# Patient Record
Sex: MALE | Race: WHITE | NOT HISPANIC OR LATINO | Employment: FULL TIME | URBAN - METROPOLITAN AREA
[De-identification: names, ages, dates, MRNs, and addresses within clinical notes are randomized per-mention and may not be internally consistent; named-entity substitution may affect disease eponyms.]

---

## 2017-01-23 ENCOUNTER — HOSPITAL ENCOUNTER (EMERGENCY)
Facility: CLINIC | Age: 42
Discharge: HOME | End: 2017-01-23
Attending: EMERGENCY MEDICINE | Admitting: EMERGENCY MEDICINE
Payer: OTHER MISCELLANEOUS

## 2017-01-23 ENCOUNTER — APPOINTMENT (OUTPATIENT)
Dept: GENERAL RADIOLOGY | Facility: CLINIC | Age: 42
End: 2017-01-23
Attending: EMERGENCY MEDICINE
Payer: OTHER MISCELLANEOUS

## 2017-01-23 VITALS
HEART RATE: 75 BPM | OXYGEN SATURATION: 99 % | DIASTOLIC BLOOD PRESSURE: 80 MMHG | RESPIRATION RATE: 20 BRPM | TEMPERATURE: 98.2 F | SYSTOLIC BLOOD PRESSURE: 132 MMHG

## 2017-01-23 DIAGNOSIS — S66.912A STRAIN OF LEFT WRIST, INITIAL ENCOUNTER: ICD-10-CM

## 2017-01-23 PROCEDURE — 73110 X-RAY EXAM OF WRIST: CPT | Mod: LT

## 2017-01-23 PROCEDURE — 99213 OFFICE O/P EST LOW 20 MIN: CPT | Performed by: EMERGENCY MEDICINE

## 2017-01-23 PROCEDURE — 99213 OFFICE O/P EST LOW 20 MIN: CPT

## 2017-01-23 NOTE — DISCHARGE INSTRUCTIONS
Return if symptoms worsen or new symptoms develop.  Follow-up with primary care physician next available.  Drink plenty of fluids.  Take ibuprofen or Tylenol for pain.  If increased pain or weakness or other symptoms present please follow-up in 1 week for repeat x-ray.  Treating Strains and Sprains  Strains and sprains happen when muscles or other soft tissues near your bones stretch or tear. These injuries can cause bruising, swelling, and pain. To ease your discomfort and speed the healing of your strain or sprain, follow the tips below. Remember, a strain or sprain can take 6 to 8 weeks to heal.     Important Note: Do not give aspirin to children or teens without discussing it with your healthcare provider first.        Ice first, heat later    Use ice for the first 24 to 48 hours after injury. Ice helps prevent swelling and reduce pain. Ice the injury for no more than 20 minutes at a time and allow at least  20 minutes between icing sessions.    Apply heat after the first 72 hours, once the swelling has gone down. Heat relaxes muscles and increases blood flow. Soak the injured area in warm water or use a heating pad set on low for no more than 15 minutes at a time.  Wrap and elevate    Wrap an injured limb firmly with an elastic bandage. This provides support and helps prevent swelling. Don t wear an elastic bandage overnight. Watch for tingling, numbness, or increased pain, and remove the bandage immediately if any of these occurs.    Elevate the injured area to help reduce swelling and throbbing. It s best to raise an injured limb above the level of your heart.     Medicines    Over-the-counter medicines such as acetaminophen or ibuprofen can help reduce pain. Some also help reduce swelling.    Take medicine only as directed.    Rest the area even if medicines are controlling the pain.  Rest    Rest the injured area by not using it for 24 hours.    When you re ready, return slowly to your normal activities.  Rest the injured area often.    Don t use or walk on an injured limb if it hurts.    2441-4387 [employer]. [address] [place] [address], [place] [address]. All rights reserved. This information is not intended as a substitute for professional medical care. Always follow your healthcare professional's instructions.

## 2017-01-23 NOTE — ED AVS SNAPSHOT
[facility] Emergency Department    [address] [address]    Phone:  [phone]    Fax:  [phone]                                       Thom Hillman   MRN: [id number]    Department:  [facility]   Date of Visit:  1/23/2017           Patient Information     Date Of Birth          1975        Your diagnoses for this visit were:     Strain of left wrist, initial encounter        You were seen by Miki Whitten MD.      Follow-up Information     Follow up with Pierre Hi MD.    Specialty:  Family Practice    Why:  Next week for recheck    Contact information:    XXX RETIRED XXX  XXX  [place] [address]          Follow up with [facility].    Specialty:  EMERGENCY MEDICINE    Why:  If symptoms worsen    Contact information:    [address] [place] [id number] [phone]    Additional information:    The medical center is located at   [address] [place] [address] (between [address] and   [facility] [address] in [place], four miles north   of [place]).        Discharge Instructions         Return if symptoms worsen or new symptoms develop.  Follow-up with primary care physician next available.  Drink plenty of fluids.  Take ibuprofen or Tylenol for pain.  If increased pain or weakness or other symptoms present please follow-up in 1 week for repeat x-ray.  Treating Strains and Sprains  Strains and sprains happen when muscles or other soft tissues near your bones stretch or tear. These injuries can cause bruising, swelling, and pain. To ease your discomfort and speed the healing of your strain or sprain, follow the tips below. Remember, a strain or sprain can take 6 to 8 weeks to heal.     Important Note: Do not give aspirin to children or teens without discussing it with your healthcare provider first.        Ice first, heat later    Use ice for the first 24 to 48 hours after injury. Ice helps prevent swelling and reduce pain. Ice the  injury for no more than 20 minutes at a time and allow at least  20 minutes between icing sessions.    Apply heat after the first 72 hours, once the swelling has gone down. Heat relaxes muscles and increases blood flow. Soak the injured area in warm water or use a heating pad set on low for no more than 15 minutes at a time.  Wrap and elevate    Wrap an injured limb firmly with an elastic bandage. This provides support and helps prevent swelling. Don t wear an elastic bandage overnight. Watch for tingling, numbness, or increased pain, and remove the bandage immediately if any of these occurs.    Elevate the injured area to help reduce swelling and throbbing. It s best to raise an injured limb above the level of your heart.     Medicines    Over-the-counter medicines such as acetaminophen or ibuprofen can help reduce pain. Some also help reduce swelling.    Take medicine only as directed.    Rest the area even if medicines are controlling the pain.  Rest    Rest the injured area by not using it for 24 hours.    When you re ready, return slowly to your normal activities. Rest the injured area often.    Don t use or walk on an injured limb if it hurts.    8876-4834 [employer]. [address] [place] [address]. All rights reserved. This information is not intended as a substitute for professional medical care. Always follow your healthcare professional's instructions.          24 Hour Appointment Hotline       To make an appointment at [facility], call [phone] ([phone]). If you don't have a family doctor or clinic, we will help you find one. [facility] clinics are conveniently located to serve the needs of you and your family.          ED Discharge Orders     [employer]                    Review of your medicines      Our records show that you are taking the medicines listed below. If these are incorrect, please call your family doctor or clinic.        Dose /  Directions Last dose taken    ALLERGEN IMMUNOTHERAPY PRESCRIPTION        Refills:  0        Beclomethasone Dipropionate 80 MCG/ACT Aers Nasal Spray   Commonly known as:  QNASL   Dose:  2 spray   Quantity:  8.7 g        Spray 2 sprays into both nostrils daily as needed   Refills:  1        EPINEPHrine 0.3 MG/0.3ML injection   Dose:  0.3 mg   Quantity:  0.6 mL        Inject 0.3 mLs (0.3 mg) into the muscle as needed for anaphylaxis   Refills:  2        levocetirizine 5 MG tablet   Commonly known as:  XYZAL   Dose:  5 mg   Quantity:  30 tablet        Take 1 tablet (5 mg) by mouth daily as needed for allergies   Refills:  11        meloxicam 7.5 MG tablet   Commonly known as:  MOBIC   Dose:  7.5 mg   Quantity:  30 tablet        Take 1 tablet (7.5 mg) by mouth daily   Refills:  1        VITAMIN D PO        Take  by mouth.   Refills:  0                Procedures and tests performed during your visit     Wrist XR, G/E 3 views, left      Orders Needing Specimen Collection     None      Pending Results     No orders found from 1/22/2017 to 1/24/2017.            Pending Culture Results     No orders found from 1/22/2017 to 1/24/2017.       Test Results from your hospital stay           1/23/2017  2:52 PM - Interface, Radiant Ib      Narrative     XR WRIST LEFT G/E 3 VIEWS 1/23/2017 2:33 PM    COMPARISON: None.    HISTORY: Pain        Impression     IMPRESSION: No displaced fractures are seen in the left wrist. Joints  are preserved and in normal alignment. Mild cystic change in the  distal pole of the scaphoid of uncertain significance, but can be seen  in the setting of prior remote nondisplaced scaphoid fracture.    SALOMÓN MILLER                Thank you for choosing [facility]       Thank you for choosing [facility] for your care. Our goal is always to provide you with excellent care. Hearing back from our patients is one way we can continue to improve our services. Please take a few minutes to complete the written survey that  you may receive in the mail after you visit with us. Thank you!        [employer] Information     [employer] gives you secure access to your electronic health record. If you see a primary care provider, you can also send messages to your care team and make appointments. If you have questions, please call your primary care clinic.  If you do not have a primary care provider, please call [phone] and they will assist you.        Care EveryWhere ID     This is your Care EveryWhere ID. This could be used by other organizations to access your [facility] medical records  [id number]        After Visit Summary       This is your record. Keep this with you and show to your community pharmacist(s) and doctor(s) at your next visit.

## 2017-01-23 NOTE — ED PROVIDER NOTES
History     Chief Complaint   Patient presents with     Wrist Pain     workmans com injury, fell one week ago, wrist pain continues.     HPI  Thom Hillman is a 41 year old male who presents to emergency department complaining of left wrist pain.  Patient fell over a week ago at work slipping and landing on an outstretched hand.  He states pain wasn't initially painful but over the last few days he has noted pain with movement of the wrist.  At rest he rates the pain at about a 3/10.worsened with movement.  He denies any other injury.  He did not hit his head denies neck, back pain or other symptoms.  Past Medical History   Diagnosis Date     Apnea, sleep      CPAP (continuous positive airway pressure) dependence        No current facility-administered medications on file prior to encounter.  Current Outpatient Prescriptions on File Prior to Encounter:  levocetirizine (XYZAL) 5 MG tablet Take 1 tablet (5 mg) by mouth daily as needed for allergies   EPINEPHrine (EPIPEN) 0.3 MG/0.3ML injection Inject 0.3 mLs (0.3 mg) into the muscle as needed for anaphylaxis   Beclomethasone Dipropionate (QNASL) 80 MCG/ACT AERS Nasal Spray Spray 2 sprays into both nostrils daily as needed   meloxicam (MOBIC) 7.5 MG tablet Take 1 tablet (7.5 mg) by mouth daily   ALLERGY SHOTS    VITAMIN D PO Take  by mouth.     Social History     Social History     Marital Status:      Spouse Name: N/A     Number of Children: N/A     Years of Education: N/A     Occupational History     Not on file.     Social History Main Topics     Smoking status: Never Smoker      Smokeless tobacco: Never Used     Alcohol Use: No     Drug Use: No     Sexual Activity: No     Other Topics Concern     Not on file     Social History Narrative       I have reviewed the Medications, Allergies, Past Medical and Surgical History, and Social History in the Epic system.    Review of Systems   Constitutional: Positive for activity change.   Cardiovascular: Negative for  chest pain.   Gastrointestinal: Negative for abdominal pain.   Musculoskeletal: Positive for myalgias and arthralgias. Negative for back pain, joint swelling, gait problem and neck pain.   Skin: Negative for rash.   Neurological: Negative for weakness, numbness and headaches.       Physical Exam   BP: 132/80 mmHg  Pulse: 75  Temp: 98.2  F (36.8  C)  Resp: 20  SpO2: 99 %  Physical Exam   Constitutional: He appears well-developed and well-nourished. No distress.   HENT:   Head: Normocephalic and atraumatic.   Neck: Normal range of motion. Neck supple.   Pulmonary/Chest: Effort normal.   Musculoskeletal:   Tenderness to palpation of the distal radius dorsally with pain increased with movement. No snuff box or ulnar tenderness. There is no erythema or edema present. Pulses and sensation are symmetrical ; there is a good hand grasp.    Neurological: He is alert. He exhibits normal muscle tone.   Skin: Skin is warm and dry. No rash noted.   Nursing note and vitals reviewed.      ED Course   Procedures             Critical Care time:  none             Results for orders placed or performed during the hospital encounter of 01/23/17   Wrist XR, G/E 3 views, left    Narrative    XR WRIST LEFT G/E 3 VIEWS 1/23/2017 2:33 PM    COMPARISON: None.    HISTORY: Pain      Impression    IMPRESSION: No displaced fractures are seen in the left wrist. Joints  are preserved and in normal alignment. Mild cystic change in the  distal pole of the scaphoid of uncertain significance, but can be seen  in the setting of prior remote nondisplaced scaphoid fracture.    SALOMÓN MILLER       Assessments & Plan (with Medical Decision Making) xray was obtained and no acute fracture was noted. Mild cystic change of scaphoid could represent prior remote injury. Findings discussed with the patient. He was given a velcro wrist splint. HE should take ibuprofen for pain and follow up with ortho in a week if not improved. Patients workman comp form was filled  out.      I have reviewed the nursing notes.    I have reviewed the findings, diagnosis, plan and need for follow up with the patient.    Discharge Medication List as of 1/23/2017  2:58 PM          Final diagnoses:   Strain of left wrist, initial encounter       1/23/2017   [facility] EMERGENCY DEPARTMENT      Miki Whitten MD  01/24/17 1134

## 2017-01-23 NOTE — ED AVS SNAPSHOT
[facility] Emergency Department    [address] [address]    Phone:  [phone]    Fax:  [phone]                                       Thom Hillman   MRN: [id number]    Department:  [facility] Emergency Department   Date of Visit:  1/23/2017           After Visit Summary Signature Page     I have received my discharge instructions, and my questions have been answered. I have discussed any challenges I see with this plan with the nurse or doctor.    ..........................................................................................................................................  Patient/Patient Representative Signature      ..........................................................................................................................................  Patient Representative Print Name and Relationship to Patient    ..................................................               ................................................  Date                                            Time    ..........................................................................................................................................  Reviewed by Signature/Title    ...................................................              ..............................................  Date                                                            Time

## 2017-01-24 ASSESSMENT — ENCOUNTER SYMPTOMS
NECK PAIN: 0
ARTHRALGIAS: 1
JOINT SWELLING: 0
ABDOMINAL PAIN: 0
MYALGIAS: 1
HEADACHES: 0
ACTIVITY CHANGE: 1
WEAKNESS: 0
BACK PAIN: 0
NUMBNESS: 0

## 2017-05-01 ENCOUNTER — TELEPHONE (OUTPATIENT)
Dept: ALLERGY | Facility: CLINIC | Age: 42
End: 2017-05-01

## 2017-05-01 NOTE — TELEPHONE ENCOUNTER
Chief Complaint   Patient presents with      allergy serum     discarding  allergy serum     Tamiko Luevano LPN

## 2019-11-03 ENCOUNTER — HEALTH MAINTENANCE LETTER (OUTPATIENT)
Age: 44
End: 2019-11-03

## 2020-08-06 ENCOUNTER — TELEPHONE (OUTPATIENT)
Dept: PSYCHIATRY | Facility: CLINIC | Age: 45
End: 2020-08-06
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
## 2020-11-16 ENCOUNTER — HEALTH MAINTENANCE LETTER (OUTPATIENT)
Age: 45
End: 2020-11-16

## 2021-09-18 ENCOUNTER — HEALTH MAINTENANCE LETTER (OUTPATIENT)
Age: 46
End: 2021-09-18

## 2022-01-08 ENCOUNTER — HEALTH MAINTENANCE LETTER (OUTPATIENT)
Age: 47
End: 2022-01-08

## 2022-11-19 ENCOUNTER — HEALTH MAINTENANCE LETTER (OUTPATIENT)
Age: 47
End: 2022-11-19

## 2023-04-09 ENCOUNTER — HEALTH MAINTENANCE LETTER (OUTPATIENT)
Age: 48
End: 2023-04-09

## 2023-04-27 ENCOUNTER — DOCUMENTATION ONLY (OUTPATIENT)
Dept: OTHER | Facility: CLINIC | Age: 48
End: 2023-04-27

## 2024-06-16 ENCOUNTER — HEALTH MAINTENANCE LETTER (OUTPATIENT)
Age: 49
End: 2024-06-16